# Patient Record
Sex: MALE | Race: OTHER | NOT HISPANIC OR LATINO
[De-identification: names, ages, dates, MRNs, and addresses within clinical notes are randomized per-mention and may not be internally consistent; named-entity substitution may affect disease eponyms.]

---

## 2021-01-04 ENCOUNTER — TRANSCRIPTION ENCOUNTER (OUTPATIENT)
Age: 56
End: 2021-01-04

## 2021-05-19 ENCOUNTER — INPATIENT (INPATIENT)
Facility: HOSPITAL | Age: 56
LOS: 1 days | Discharge: HOME | End: 2021-05-21
Attending: INTERNAL MEDICINE | Admitting: INTERNAL MEDICINE
Payer: MEDICAID

## 2021-05-19 VITALS
DIASTOLIC BLOOD PRESSURE: 63 MMHG | HEART RATE: 102 BPM | SYSTOLIC BLOOD PRESSURE: 104 MMHG | OXYGEN SATURATION: 100 % | RESPIRATION RATE: 18 BRPM | TEMPERATURE: 96 F

## 2021-05-19 LAB
ALBUMIN SERPL ELPH-MCNC: 4.4 G/DL — SIGNIFICANT CHANGE UP (ref 3.5–5.2)
ALP SERPL-CCNC: 84 U/L — SIGNIFICANT CHANGE UP (ref 30–115)
ALT FLD-CCNC: 16 U/L — SIGNIFICANT CHANGE UP (ref 0–41)
ANION GAP SERPL CALC-SCNC: 15 MMOL/L — HIGH (ref 7–14)
APTT BLD: 30.4 SEC — SIGNIFICANT CHANGE UP (ref 27–39.2)
AST SERPL-CCNC: 18 U/L — SIGNIFICANT CHANGE UP (ref 0–41)
BASOPHILS # BLD AUTO: 0.08 K/UL — SIGNIFICANT CHANGE UP (ref 0–0.2)
BASOPHILS NFR BLD AUTO: 0.5 % — SIGNIFICANT CHANGE UP (ref 0–1)
BILIRUB SERPL-MCNC: 0.2 MG/DL — SIGNIFICANT CHANGE UP (ref 0.2–1.2)
BLD GP AB SCN SERPL QL: SIGNIFICANT CHANGE UP
BUN SERPL-MCNC: 14 MG/DL — SIGNIFICANT CHANGE UP (ref 10–20)
CALCIUM SERPL-MCNC: 9.2 MG/DL — SIGNIFICANT CHANGE UP (ref 8.5–10.1)
CHLORIDE SERPL-SCNC: 104 MMOL/L — SIGNIFICANT CHANGE UP (ref 98–110)
CO2 SERPL-SCNC: 16 MMOL/L — LOW (ref 17–32)
CREAT SERPL-MCNC: 1.5 MG/DL — SIGNIFICANT CHANGE UP (ref 0.7–1.5)
EOSINOPHIL # BLD AUTO: 0.25 K/UL — SIGNIFICANT CHANGE UP (ref 0–0.7)
EOSINOPHIL NFR BLD AUTO: 1.7 % — SIGNIFICANT CHANGE UP (ref 0–8)
GLUCOSE BLDC GLUCOMTR-MCNC: 128 MG/DL — HIGH (ref 70–99)
GLUCOSE SERPL-MCNC: 118 MG/DL — HIGH (ref 70–99)
HCT VFR BLD CALC: 45.5 % — SIGNIFICANT CHANGE UP (ref 42–52)
HGB BLD-MCNC: 15.6 G/DL — SIGNIFICANT CHANGE UP (ref 14–18)
IMM GRANULOCYTES NFR BLD AUTO: 0.5 % — HIGH (ref 0.1–0.3)
INR BLD: 0.94 RATIO — SIGNIFICANT CHANGE UP (ref 0.65–1.3)
LYMPHOCYTES # BLD AUTO: 36.4 % — SIGNIFICANT CHANGE UP (ref 20.5–51.1)
LYMPHOCYTES # BLD AUTO: 5.38 K/UL — HIGH (ref 1.2–3.4)
MCHC RBC-ENTMCNC: 30.5 PG — SIGNIFICANT CHANGE UP (ref 27–31)
MCHC RBC-ENTMCNC: 34.3 G/DL — SIGNIFICANT CHANGE UP (ref 32–37)
MCV RBC AUTO: 89 FL — SIGNIFICANT CHANGE UP (ref 80–94)
MONOCYTES # BLD AUTO: 1.05 K/UL — HIGH (ref 0.1–0.6)
MONOCYTES NFR BLD AUTO: 7.1 % — SIGNIFICANT CHANGE UP (ref 1.7–9.3)
NEUTROPHILS # BLD AUTO: 7.93 K/UL — HIGH (ref 1.4–6.5)
NEUTROPHILS NFR BLD AUTO: 53.8 % — SIGNIFICANT CHANGE UP (ref 42.2–75.2)
NRBC # BLD: 0 /100 WBCS — SIGNIFICANT CHANGE UP (ref 0–0)
PLATELET # BLD AUTO: 289 K/UL — SIGNIFICANT CHANGE UP (ref 130–400)
POTASSIUM SERPL-MCNC: 4.4 MMOL/L — SIGNIFICANT CHANGE UP (ref 3.5–5)
POTASSIUM SERPL-SCNC: 4.4 MMOL/L — SIGNIFICANT CHANGE UP (ref 3.5–5)
PROT SERPL-MCNC: 7.6 G/DL — SIGNIFICANT CHANGE UP (ref 6–8)
PROTHROM AB SERPL-ACNC: 10.8 SEC — SIGNIFICANT CHANGE UP (ref 9.95–12.87)
RAPID RVP RESULT: SIGNIFICANT CHANGE UP
RBC # BLD: 5.11 M/UL — SIGNIFICANT CHANGE UP (ref 4.7–6.1)
RBC # FLD: 12.8 % — SIGNIFICANT CHANGE UP (ref 11.5–14.5)
SARS-COV-2 RNA SPEC QL NAA+PROBE: SIGNIFICANT CHANGE UP
SODIUM SERPL-SCNC: 135 MMOL/L — SIGNIFICANT CHANGE UP (ref 135–146)
TROPONIN T SERPL-MCNC: 0.03 NG/ML — CRITICAL HIGH
WBC # BLD: 14.77 K/UL — HIGH (ref 4.8–10.8)
WBC # FLD AUTO: 14.77 K/UL — HIGH (ref 4.8–10.8)

## 2021-05-19 PROCEDURE — 93010 ELECTROCARDIOGRAM REPORT: CPT

## 2021-05-19 PROCEDURE — 71045 X-RAY EXAM CHEST 1 VIEW: CPT | Mod: 26

## 2021-05-19 PROCEDURE — 99222 1ST HOSP IP/OBS MODERATE 55: CPT

## 2021-05-19 PROCEDURE — 99285 EMERGENCY DEPT VISIT HI MDM: CPT

## 2021-05-19 RX ORDER — NICOTINE POLACRILEX 2 MG
1 GUM BUCCAL DAILY
Refills: 0 | Status: DISCONTINUED | OUTPATIENT
Start: 2021-05-19 | End: 2021-05-21

## 2021-05-19 RX ORDER — ATORVASTATIN CALCIUM 80 MG/1
80 TABLET, FILM COATED ORAL AT BEDTIME
Refills: 0 | Status: DISCONTINUED | OUTPATIENT
Start: 2021-05-19 | End: 2021-05-21

## 2021-05-19 RX ORDER — METOPROLOL TARTRATE 50 MG
12.5 TABLET ORAL
Refills: 0 | Status: DISCONTINUED | OUTPATIENT
Start: 2021-05-19 | End: 2021-05-20

## 2021-05-19 RX ORDER — TICAGRELOR 90 MG/1
90 TABLET ORAL EVERY 12 HOURS
Refills: 0 | Status: DISCONTINUED | OUTPATIENT
Start: 2021-05-20 | End: 2021-05-21

## 2021-05-19 RX ORDER — ASPIRIN/CALCIUM CARB/MAGNESIUM 324 MG
81 TABLET ORAL DAILY
Refills: 0 | Status: DISCONTINUED | OUTPATIENT
Start: 2021-05-19 | End: 2021-05-21

## 2021-05-19 RX ORDER — SODIUM CHLORIDE 9 MG/ML
1000 INJECTION INTRAMUSCULAR; INTRAVENOUS; SUBCUTANEOUS
Refills: 0 | Status: DISCONTINUED | OUTPATIENT
Start: 2021-05-19 | End: 2021-05-20

## 2021-05-19 RX ORDER — ENOXAPARIN SODIUM 100 MG/ML
40 INJECTION SUBCUTANEOUS AT BEDTIME
Refills: 0 | Status: DISCONTINUED | OUTPATIENT
Start: 2021-05-19 | End: 2021-05-21

## 2021-05-19 RX ADMIN — ATORVASTATIN CALCIUM 80 MILLIGRAM(S): 80 TABLET, FILM COATED ORAL at 21:18

## 2021-05-19 RX ADMIN — Medication 1 PATCH: at 22:10

## 2021-05-19 RX ADMIN — ENOXAPARIN SODIUM 40 MILLIGRAM(S): 100 INJECTION SUBCUTANEOUS at 22:11

## 2021-05-19 RX ADMIN — Medication 12.5 MILLIGRAM(S): at 22:11

## 2021-05-19 NOTE — CHART NOTE - NSCHARTNOTEFT_GEN_A_CORE
PRE-OP DIAGNOSIS: STEMI    PROCEDURE:[x  ] LHC                         [ x ] Coronary angiography                         [  ] [facility]  Physician: Dr Bush  Assistant: Dr Andres Forrest    ANESTHESIA TYPE:  [  ]General Anesthesia  [ x ] Sedation  [  ] Local/Regional    ESTIMATED BLOOD LOSS:    10   mL    CONDITION  [  ] Critical  [  ] Serious  [  ]Fair  [ x ]Good    IV CONTRAST:  150  mL    FINDINGS  Left Heart Catheterization:  LVEF%:  LVEDP:  [ ] Normal Coronary Arteries  [ ] Luminal Irregularities  [ ] Non-obstructive CAD    ACCESS:    [x ] right radial artery  [ ] right femoral artery    HEMOSTASIS:    [ x] D-Stat  [ ] Perclose  [ ] Manual compression    LEFT HEART CATHETERIZATION                                    Left main: no disease  LAD:  100% acute occlusion in mid segment  Left Circumflex: patent  Right Coronary Artery: 100% ostial chronic total occlusion , filled by collateral distally    SYNTAX I/II SCORE:    INTERVENTION  IMPLANTS: 2 ROMI    APPROPRIATE USE CRITERIA (AUC): 9    SPECIMEN REMOVED: N/A      POST-OP DIAGNOSIS  STEMI , culprit lesion is mid LAD s/p IVUS guided PCI with 2 ROMI        PLAN OF CARE  admit to CCU  [ x]  Continue DAPT, B-blocker & Statin therapy  IV fluids  check 2d echo

## 2021-05-19 NOTE — ED ADULT NURSE NOTE - NSIMPLEMENTINTERV_GEN_ALL_ED
Implemented All Fall with Harm Risk Interventions:  [place] to call system. Call bell, personal items and telephone within reach. Instruct patient to call for assistance. Room bathroom lighting operational. Non-slip footwear when patient is off stretcher. Physically safe environment: no spills, clutter or unnecessary equipment. Stretcher in lowest position, wheels locked, appropriate side rails in place. Provide visual cue, wrist band, yellow gown, etc. Monitor gait and stability. Monitor for mental status changes and reorient to person, place, and time. Review medications for side effects contributing to fall risk. Reinforce activity limits and safety measures with patient and family. Provide visual clues: red socks.

## 2021-05-19 NOTE — ED PROVIDER NOTE - OBJECTIVE STATEMENT
56 y/o male with hx of CAD (no stents), presents for chest pain that started 40 minutes PTA, moderate, constant, no change since onset. No n/v, diaphoresis. Pt took ASA 81x4 PTA. EMS with STEMI on EKG, nitro x2 and morphine given. STEMI code called as a pre-note. Upon arrival to ED pt looks comfortable.  No back pain, edema of lower extremities, calf pain.

## 2021-05-19 NOTE — ED PROVIDER NOTE - NS ED ROS FT
Constitutional: See HPI.  Eyes: No visual changes, eye pain or discharge.  ENMT: No hearing changes, pain, discharge or infections.   Cardiac: + chest pain. No SOB or edema. No chest pain with exertion.  Respiratory: No cough or respiratory distress.   GI: No nausea, vomiting, diarrhea or abdominal pain.  : No dysuria, frequency or burning. No Discharge  MS: No myalgia, muscle weakness, joint pain or back pain.  Neuro: No headache or weakness.   Skin: No skin rash.  Except as documented in the HPI, all other systems are negative.

## 2021-05-19 NOTE — H&P ADULT - NSICDXFAMILYHX_GEN_ALL_CORE_FT
FAMILY HISTORY:  Mother  Still living? Unknown  FH: coronary artery disease, Age at diagnosis: Age Unknown    Sibling  Still living? Unknown  FH: coronary artery disease, Age at diagnosis: Age Unknown

## 2021-05-19 NOTE — H&P ADULT - NSHPLABSRESULTS_GEN_ALL_CORE
15.6   14.77 )-----------( 289      ( 19 May 2021 16:35 )             45.5             05-19    135  |  104  |  14  ----------------------------<  118<H>  4.4   |  16<L>  |  1.5    Ca    9.2      19 May 2021 16:35    TPro  7.6  /  Alb  4.4  /  TBili  0.2  /  DBili  x   /  AST  18  /  ALT  16  /  AlkPhos  84  05-19    LIVER FUNCTIONS - ( 19 May 2021 16:35 )  Alb: 4.4 g/dL / Pro: 7.6 g/dL / ALK PHOS: 84 U/L / ALT: 16 U/L / AST: 18 U/L / GGT: x         PT/INR - ( 19 May 2021 16:35 )   PT: 10.80 sec;   INR: 0.94 ratio         PTT - ( 19 May 2021 16:35 )  PTT:30.4 sec  CARDIAC MARKERS ( 19 May 2021 16:35 )  x     / 0.03 ng/mL / x     / x     / x

## 2021-05-19 NOTE — ED PROVIDER NOTE - ATTENDING CONTRIBUTION TO CARE
I personally evaluated the patient. I reviewed the Resident’s or Physician Assistant’s note (as assigned above), and agree with the findings and plan except as documented in my note.    Pt is a 56 y/o male with hx of CAD (no stents), presents for chest pain that started 40 minutes PTA, moderate, constant, no change since onset. No n/v, diaphoresis. Pt took ASA 81x4 PTA. EMS with sTEMI on EKG, nitro x2 and morphine givne. STEMI code called with milady.    Constitutional: Well developed, well nourished. In moderate distress.  Head: Normocephalic, atraumatic.  Eyes: PERRL. EOMI.  ENT: No nasal discharge. Mucous membranes moist.  Neck: Supple. Painless ROM.  Cardiovascular: Normal S1, S2. Regular rate and rhythm. No murmurs, rubs, or gallops.  Pulmonary: Normal respiratory rate and effort. Lungs clear to auscultation bilaterally. No wheezing, rales, or rhonchi.  Abdominal: Soft. Nondistended. Nontender. No rebound, guarding, rigidity.  Extremities. Pelvis stable. No lower extremity edema, symmetric calves.  Skin: No rashes, cyanosis.  Neuro: AAOx3. No focal neurological deficits.  Psych: Normal mood. Normal affect.

## 2021-05-19 NOTE — ED PROVIDER NOTE - CLINICAL SUMMARY MEDICAL DECISION MAKING FREE TEXT BOX
Pt presented to ED for chest pain, STEMI code called prearrival. ED EKG with STEMI, cardiology to take to cath lab. No brilinta/plavix per cards as pt may need CABG. Labs sent. Pt admitted to CCU.

## 2021-05-19 NOTE — H&P ADULT - HISTORY OF PRESENT ILLNESS
Chest pain 1 hr duration    56 yo male with no significant pmh but significant family history of CAD in mother and brother.   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 He called EMS, was done EKG found to have stemi and brought to the ED.  Aspirin was given on route.  patient also reports that he had brief episode of chest discomfort yesterday morning but got relieved on its own.  no Headache, focal deficit, SOB, cough, fever, abdominal pain or swelling, frequency or urgency. does not take any medication at home.    in the ED, EKG showed STEMI anterior, taken to cath lab s/p PCI, ROMI to mLAD.

## 2021-05-19 NOTE — H&P ADULT - ASSESSMENT
Impression:  STEMI: anterolateral  s/p PCI on mid LAD with 2 ROMI      Plan:  DAPT with aspirin and plavix  statin  ECHO  lifestyle modification  beta blocker  follow up with cardiology   Impression:  STEMI: anterior  s/p PCI on mid LAD with 2 ROMI      Plan:  DAPT with aspirin and brillinta  lipitor 80 daily  ECHO  lifestyle modification  smoking cessation  nicotine patch  beta blocker metoprolol 12.5 BID  follow up with cardiology  IV fluid NS at 100 cc/hr for 6 hr  remove D-stat at 10:30 pm  dc home likely in am  repeat labs in am  ccu monitoring overnight

## 2021-05-19 NOTE — ED ADULT TRIAGE NOTE - CHIEF COMPLAINT QUOTE
Patient was prenotification STEMI alert c/o 25 min of severe CP. 4 asa, 5 mg Morphine IVP, and 2 sublingual nitro given prior to arrival. Patient presents with 10/10 pain and diaphoretic

## 2021-05-20 LAB
A1C WITH ESTIMATED AVERAGE GLUCOSE RESULT: 5.3 % — SIGNIFICANT CHANGE UP (ref 4–5.6)
ALBUMIN SERPL ELPH-MCNC: 3.8 G/DL — SIGNIFICANT CHANGE UP (ref 3.5–5.2)
ALBUMIN SERPL ELPH-MCNC: 3.9 G/DL — SIGNIFICANT CHANGE UP (ref 3.5–5.2)
ALP SERPL-CCNC: 74 U/L — SIGNIFICANT CHANGE UP (ref 30–115)
ALP SERPL-CCNC: 78 U/L — SIGNIFICANT CHANGE UP (ref 30–115)
ALT FLD-CCNC: 14 U/L — SIGNIFICANT CHANGE UP (ref 0–41)
ALT FLD-CCNC: 15 U/L — SIGNIFICANT CHANGE UP (ref 0–41)
ANION GAP SERPL CALC-SCNC: 11 MMOL/L — SIGNIFICANT CHANGE UP (ref 7–14)
ANION GAP SERPL CALC-SCNC: 12 MMOL/L — SIGNIFICANT CHANGE UP (ref 7–14)
AST SERPL-CCNC: 25 U/L — SIGNIFICANT CHANGE UP (ref 0–41)
AST SERPL-CCNC: 30 U/L — SIGNIFICANT CHANGE UP (ref 0–41)
BASOPHILS # BLD AUTO: 0.08 K/UL — SIGNIFICANT CHANGE UP (ref 0–0.2)
BASOPHILS NFR BLD AUTO: 0.5 % — SIGNIFICANT CHANGE UP (ref 0–1)
BILIRUB SERPL-MCNC: 0.4 MG/DL — SIGNIFICANT CHANGE UP (ref 0.2–1.2)
BILIRUB SERPL-MCNC: 0.7 MG/DL — SIGNIFICANT CHANGE UP (ref 0.2–1.2)
BUN SERPL-MCNC: 14 MG/DL — SIGNIFICANT CHANGE UP (ref 10–20)
BUN SERPL-MCNC: 14 MG/DL — SIGNIFICANT CHANGE UP (ref 10–20)
CALCIUM SERPL-MCNC: 8.9 MG/DL — SIGNIFICANT CHANGE UP (ref 8.5–10.1)
CALCIUM SERPL-MCNC: 9.2 MG/DL — SIGNIFICANT CHANGE UP (ref 8.5–10.1)
CHLORIDE SERPL-SCNC: 102 MMOL/L — SIGNIFICANT CHANGE UP (ref 98–110)
CHLORIDE SERPL-SCNC: 104 MMOL/L — SIGNIFICANT CHANGE UP (ref 98–110)
CHOLEST SERPL-MCNC: 196 MG/DL — SIGNIFICANT CHANGE UP
CO2 SERPL-SCNC: 18 MMOL/L — SIGNIFICANT CHANGE UP (ref 17–32)
CO2 SERPL-SCNC: 21 MMOL/L — SIGNIFICANT CHANGE UP (ref 17–32)
COVID-19 SPIKE DOMAIN AB INTERP: POSITIVE
COVID-19 SPIKE DOMAIN ANTIBODY RESULT: 184 U/ML — HIGH
CREAT SERPL-MCNC: 1.3 MG/DL — SIGNIFICANT CHANGE UP (ref 0.7–1.5)
CREAT SERPL-MCNC: 1.4 MG/DL — SIGNIFICANT CHANGE UP (ref 0.7–1.5)
EOSINOPHIL # BLD AUTO: 0.15 K/UL — SIGNIFICANT CHANGE UP (ref 0–0.7)
EOSINOPHIL NFR BLD AUTO: 0.9 % — SIGNIFICANT CHANGE UP (ref 0–8)
ESTIMATED AVERAGE GLUCOSE: 105 MG/DL — SIGNIFICANT CHANGE UP (ref 68–114)
GLUCOSE SERPL-MCNC: 90 MG/DL — SIGNIFICANT CHANGE UP (ref 70–99)
GLUCOSE SERPL-MCNC: 93 MG/DL — SIGNIFICANT CHANGE UP (ref 70–99)
HCT VFR BLD CALC: 45.3 % — SIGNIFICANT CHANGE UP (ref 42–52)
HCT VFR BLD CALC: 45.3 % — SIGNIFICANT CHANGE UP (ref 42–52)
HDLC SERPL-MCNC: 31 MG/DL — LOW
HGB BLD-MCNC: 15.3 G/DL — SIGNIFICANT CHANGE UP (ref 14–18)
HGB BLD-MCNC: 15.4 G/DL — SIGNIFICANT CHANGE UP (ref 14–18)
IMM GRANULOCYTES NFR BLD AUTO: 0.5 % — HIGH (ref 0.1–0.3)
LIPID PNL WITH DIRECT LDL SERPL: 147 MG/DL — HIGH
LYMPHOCYTES # BLD AUTO: 13.1 % — LOW (ref 20.5–51.1)
LYMPHOCYTES # BLD AUTO: 2.18 K/UL — SIGNIFICANT CHANGE UP (ref 1.2–3.4)
MAGNESIUM SERPL-MCNC: 1.9 MG/DL — SIGNIFICANT CHANGE UP (ref 1.8–2.4)
MCHC RBC-ENTMCNC: 30.5 PG — SIGNIFICANT CHANGE UP (ref 27–31)
MCHC RBC-ENTMCNC: 30.6 PG — SIGNIFICANT CHANGE UP (ref 27–31)
MCHC RBC-ENTMCNC: 33.8 G/DL — SIGNIFICANT CHANGE UP (ref 32–37)
MCHC RBC-ENTMCNC: 34 G/DL — SIGNIFICANT CHANGE UP (ref 32–37)
MCV RBC AUTO: 90.1 FL — SIGNIFICANT CHANGE UP (ref 80–94)
MCV RBC AUTO: 90.4 FL — SIGNIFICANT CHANGE UP (ref 80–94)
MONOCYTES # BLD AUTO: 0.85 K/UL — HIGH (ref 0.1–0.6)
MONOCYTES NFR BLD AUTO: 5.1 % — SIGNIFICANT CHANGE UP (ref 1.7–9.3)
NEUTROPHILS # BLD AUTO: 13.3 K/UL — HIGH (ref 1.4–6.5)
NEUTROPHILS NFR BLD AUTO: 79.9 % — HIGH (ref 42.2–75.2)
NON HDL CHOLESTEROL: 165 MG/DL — HIGH
NRBC # BLD: 0 /100 WBCS — SIGNIFICANT CHANGE UP (ref 0–0)
NRBC # BLD: 0 /100 WBCS — SIGNIFICANT CHANGE UP (ref 0–0)
PHOSPHATE SERPL-MCNC: 3.4 MG/DL — SIGNIFICANT CHANGE UP (ref 2.1–4.9)
PLATELET # BLD AUTO: 250 K/UL — SIGNIFICANT CHANGE UP (ref 130–400)
PLATELET # BLD AUTO: 255 K/UL — SIGNIFICANT CHANGE UP (ref 130–400)
POTASSIUM SERPL-MCNC: 4.2 MMOL/L — SIGNIFICANT CHANGE UP (ref 3.5–5)
POTASSIUM SERPL-MCNC: 4.3 MMOL/L — SIGNIFICANT CHANGE UP (ref 3.5–5)
POTASSIUM SERPL-SCNC: 4.2 MMOL/L — SIGNIFICANT CHANGE UP (ref 3.5–5)
POTASSIUM SERPL-SCNC: 4.3 MMOL/L — SIGNIFICANT CHANGE UP (ref 3.5–5)
PROT SERPL-MCNC: 6.6 G/DL — SIGNIFICANT CHANGE UP (ref 6–8)
PROT SERPL-MCNC: 7 G/DL — SIGNIFICANT CHANGE UP (ref 6–8)
RBC # BLD: 5.01 M/UL — SIGNIFICANT CHANGE UP (ref 4.7–6.1)
RBC # BLD: 5.03 M/UL — SIGNIFICANT CHANGE UP (ref 4.7–6.1)
RBC # FLD: 13.2 % — SIGNIFICANT CHANGE UP (ref 11.5–14.5)
RBC # FLD: 13.2 % — SIGNIFICANT CHANGE UP (ref 11.5–14.5)
SARS-COV-2 IGG+IGM SERPL QL IA: 184 U/ML — HIGH
SARS-COV-2 IGG+IGM SERPL QL IA: POSITIVE
SODIUM SERPL-SCNC: 133 MMOL/L — LOW (ref 135–146)
SODIUM SERPL-SCNC: 135 MMOL/L — SIGNIFICANT CHANGE UP (ref 135–146)
TRIGL SERPL-MCNC: 205 MG/DL — HIGH
WBC # BLD: 13.43 K/UL — HIGH (ref 4.8–10.8)
WBC # BLD: 16.65 K/UL — HIGH (ref 4.8–10.8)
WBC # FLD AUTO: 13.43 K/UL — HIGH (ref 4.8–10.8)
WBC # FLD AUTO: 16.65 K/UL — HIGH (ref 4.8–10.8)

## 2021-05-20 PROCEDURE — 93010 ELECTROCARDIOGRAM REPORT: CPT

## 2021-05-20 PROCEDURE — 93306 TTE W/DOPPLER COMPLETE: CPT | Mod: 26

## 2021-05-20 RX ORDER — METOPROLOL TARTRATE 50 MG
25 TABLET ORAL
Refills: 0 | Status: DISCONTINUED | OUTPATIENT
Start: 2021-05-20 | End: 2021-05-21

## 2021-05-20 RX ORDER — CHLORHEXIDINE GLUCONATE 213 G/1000ML
1 SOLUTION TOPICAL
Refills: 0 | Status: DISCONTINUED | OUTPATIENT
Start: 2021-05-20 | End: 2021-05-21

## 2021-05-20 RX ORDER — METOPROLOL TARTRATE 50 MG
12.5 TABLET ORAL ONCE
Refills: 0 | Status: COMPLETED | OUTPATIENT
Start: 2021-05-20 | End: 2021-05-20

## 2021-05-20 RX ORDER — TICAGRELOR 90 MG/1
1 TABLET ORAL
Qty: 60 | Refills: 1
Start: 2021-05-20 | End: 2021-07-18

## 2021-05-20 RX ADMIN — TICAGRELOR 90 MILLIGRAM(S): 90 TABLET ORAL at 06:57

## 2021-05-20 RX ADMIN — TICAGRELOR 90 MILLIGRAM(S): 90 TABLET ORAL at 17:25

## 2021-05-20 RX ADMIN — Medication 81 MILLIGRAM(S): at 11:48

## 2021-05-20 RX ADMIN — Medication 25 MILLIGRAM(S): at 17:25

## 2021-05-20 RX ADMIN — ATORVASTATIN CALCIUM 80 MILLIGRAM(S): 80 TABLET, FILM COATED ORAL at 21:03

## 2021-05-20 RX ADMIN — Medication 12.5 MILLIGRAM(S): at 05:57

## 2021-05-20 RX ADMIN — Medication 12.5 MILLIGRAM(S): at 11:35

## 2021-05-20 RX ADMIN — ENOXAPARIN SODIUM 40 MILLIGRAM(S): 100 INJECTION SUBCUTANEOUS at 21:03

## 2021-05-20 RX ADMIN — Medication 1 PATCH: at 11:48

## 2021-05-20 RX ADMIN — Medication 1 PATCH: at 11:47

## 2021-05-20 RX ADMIN — Medication 1 PATCH: at 18:56

## 2021-05-20 RX ADMIN — Medication 1 PATCH: at 05:54

## 2021-05-20 NOTE — PROGRESS NOTE ADULT - SUBJECTIVE AND OBJECTIVE BOX
Cardiology Follow up    DRISS MOORE   55y Male  PAST MEDICAL & SURGICAL HISTORY:  No pertinent past medical history    No significant past surgical history         HPI:  Chest pain 1 hr duration    56 yo male with no significant pmh but significant family history of CAD in mother and brother.   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 He called EMS, was done EKG found to have stemi and brought to the ED.  Aspirin was given on route.  patient also reports that he had brief episode of chest discomfort yesterday morning but got relieved on its own.  no Headache, focal deficit, SOB, cough, fever, abdominal pain or swelling, frequency or urgency. does not take any medication at home.    in the ED, EKG showed STEMI anterior, taken to cath lab s/p PCI, ROMI to mLAD. (19 May 2021 20:20)    Allergies    No Known Allergies    Intolerances    Patient seen and examined at bedside. No acute events overnight.  Patient reported short spells of dyspnea overnight lasting couple of second each, feeling better in AM - from taking Brilinta.   Denies CP, SOB, palpitations, or dizziness  NSVT 11 beats event noted on telemetry overnight    Vital Signs Last 24 Hrs  T(C): 36.2 (20 May 2021 12:00), Max: 37.1 (19 May 2021 20:12)  T(F): 97.2 (20 May 2021 12:00), Max: 98.7 (19 May 2021 20:12)  HR: 84 (20 May 2021 12:00) (78 - 102)  BP: 129/86 (20 May 2021 12:00) (104/63 - 129/86)  BP(mean): 106 (20 May 2021 12:00) (83 - 106)  RR: 18 (20 May 2021 12:00) (12 - 21)  SpO2: 97% (20 May 2021 12:00) (93% - 100%)    MEDICATIONS  (STANDING):  aspirin  chewable 81 milliGRAM(s) Oral daily  atorvastatin 80 milliGRAM(s) Oral at bedtime  chlorhexidine 4% Liquid 1 Application(s) Topical <User Schedule>  enoxaparin Injectable 40 milliGRAM(s) SubCutaneous at bedtime  metoprolol tartrate 25 milliGRAM(s) Oral two times a day  nicotine -   7 mG/24Hr(s) Patch 1 patch Transdermal daily  ticagrelor 90 milliGRAM(s) Oral every 12 hours    MEDICATIONS  (PRN):    REVIEW OF SYSTEMS:          All negative except as mentioned in HPI    PHYSICAL EXAM:           CONSTITUTIONAL: Well-developed; well-nourished; in no acute distress  	SKIN: warm, dry  	HEAD: Normocephalic; atraumatic  	EYES: PERRL.  	ENT: No nasal discharge, airway clear, mucous membranes moist  	NECK: Supple; non tender.  	CARD: +S1, +S2, no murmurs, gallops, or rubs. Regular rate and rhythm    	RESP: No wheezes, rales or rhonchi. CTA B/L  	ABD: soft ntnd, + BS x 4 quadrants  	EXT: moves all extremities,  no clubbing, cyanosis or edema  	NEURO: Alert and oriented x3, no focal deficits          PSYCH: Cooperative, appropriate          VASCULAR:  + 2 Rad / + 2 PTs / + 2 DPs          EXTREMITY:              Right Radial: pressure dressing removed, access site soft, no hematoma, no pain, + pulses, no sign of infection, no numbness            ECG:   < from: 12 Lead ECG (05.20.21 @ 05:31) >  Ventricular Rate 79 BPM    Atrial Rate 79 BPM    P-R Interval 158 ms    QRS Duration 70 ms    Q-T Interval 346 ms    QTC Calculation(Bazett) 396 ms    P Axis -7 degrees    R Axis 57 degrees    T Axis 65 degrees    Diagnosis Line Normal sinus rhythm  Normal ECG    Confirmed by Sebastian Moreira ([id number]) on 5/20/2021 8:06:47 AM                                                                                          2D ECHO:  P    LABS:                        15.4   16.65 )-----------( 250      ( 20 May 2021 04:17 )             45.3     05-20    133<L>  |  104  |  14  ----------------------------<  93  4.3   |  18  |  1.3    Ca    8.9      20 May 2021 04:17  Phos  3.4     05-20  Mg     1.9     05-20    TPro  6.6  /  Alb  3.8  /  TBili  0.4  /  DBili  x   /  AST  30  /  ALT  15  /  AlkPhos  78  05-20    CARDIAC MARKERS ( 19 May 2021 16:35 )  x     / 0.03 ng/mL / x     / x     / x        Magnesium, Serum: 1.9 mg/dL [1.8 - 2.4] (05-20-21 @ 04:17)  LIVER FUNCTIONS - ( 20 May 2021 04:17 )  Alb: 3.8 g/dL / Pro: 6.6 g/dL / ALK PHOS: 78 U/L / ALT: 15 U/L / AST: 30 U/L / GGT: x           LDL Cholesterol Calculated: 147 mg/dL   A1C with Estimated Average Glucose Result: 5.3    A/P:  I discussed the case with Cardiologist Dr. Bush and recommend the following:    S/P PCI:   INTERVENTION  IMPLANTS: 2 ROMI    APPROPRIATE USE CRITERIA (AUC): 9    POST-OP DIAGNOSIS  STEMI , culprit lesion is mid LAD s/p IVUS guided PCI with 2 ROMI                      F/U 2D Echo result                    No ACEi/ARB due to elevated Cr, will reevaluate tomorrow                    Titrate BB to HR 60-70 bmp  	     Continue DAPT ( Aspirin 81 mg PO Daily and Brilinta 90 mg PO q12hr ), B-Blocker, Statin Therapy                   Patient pharmacy called in for Brilinta prescription and it is 30$ per month, patient is agreeing for it and medication is available for  5/21/21                    GI / DVT prophylaxis                    Keep K = 4, Mg = 2                   Ambulate as tolerated, OOB to chair                    Patient agreeing to take DAPT for at least one year or as directed by cardiologist                    Pt given instructions on importance of taking antiplatelet medication or risk acute stent thrombosis/death                   Post cath instructions, access site care and activity restrictions reviewed with patient                     Discussed with patient to return to hospital if experience chest pain, shortness breath, dizziness and site bleeding                   Aggressive risk factor modification, diet counseling, smoking cessation discussed with patient                       Cardiac rehab information provided/ referral and communication to cardiac rehab provided                   Monitor in CCU

## 2021-05-20 NOTE — PROGRESS NOTE ADULT - SUBJECTIVE AND OBJECTIVE BOX
HPI:  Chest pain 1 hr duration    56 yo male with no significant pmh but significant family history of CAD in mother and brother.   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 He called EMS, was done EKG found to have stemi and brought to the ED.  Aspirin was given on route.  patient also reports that he had brief episode of chest discomfort yesterday morning but got relieved on its own.  no Headache, focal deficit, SOB, cough, fever, abdominal pain or swelling, frequency or urgency. does not take any medication at home.    in the ED, EKG showed STEMI anterior, taken to cath lab s/p PCI, ROMI to mLAD. (19 May 2021 20:20)      INTERVAL HISTORY:  pt admitted yesterday for STEMI--s/p cath with PCI x2 ROMI to mid-LAD lesion    This morning, pt feels much improved. rates CP at /10 compared to 10/10 on presentation. denies any SOB, n/v/d/c, has not yet gotten OOB    PAST MEDICAL & SURGICAL HISTORY  No pertinent past medical history    No significant past surgical history        ALLERGIES:  No Known Allergies      MEDICATIONS:  MEDICATIONS  (STANDING):  aspirin  chewable 81 milliGRAM(s) Oral daily  atorvastatin 80 milliGRAM(s) Oral at bedtime  chlorhexidine 4% Liquid 1 Application(s) Topical <User Schedule>  enoxaparin Injectable 40 milliGRAM(s) SubCutaneous at bedtime  metoprolol tartrate 12.5 milliGRAM(s) Oral two times a day  nicotine -   7 mG/24Hr(s) Patch 1 patch Transdermal daily  sodium chloride 0.9%. 1000 milliLiter(s) (100 mL/Hr) IV Continuous <Continuous>  ticagrelor 90 milliGRAM(s) Oral every 12 hours    MEDICATIONS  (PRN):      HOME MEDICATIONS:  Home Medications:        OBJECTIVE:  ICU Vital Signs Last 24 Hrs  T(C): 37.1 (19 May 2021 20:12), Max: 37.1 (19 May 2021 20:12)  T(F): 98.7 (19 May 2021 20:12), Max: 98.7 (19 May 2021 20:12)  HR: 78 (20 May 2021 06:05) (78 - 102)  BP: 111/70 (20 May 2021 06:05) (104/63 - 127/87)  BP(mean): 83 (20 May 2021 06:05) (83 - 101)  ABP: --  ABP(mean): --  RR: 16 (20 May 2021 06:05) (12 - 21)  SpO2: 95% (20 May 2021 04:05) (93% - 100%)      Adult Advanced Hemodynamics Last 24 Hrs  CVP(mm Hg): --  CVP(cm H2O): --  CO: --  CI: --  PA: --  PA(mean): --  PCWP: --  SVR: --  SVRI: --  PVR: --  PVRI: --  I&O's Summary    19 May 2021 07:01  -  20 May 2021 06:53  --------------------------------------------------------  IN: 1000 mL / OUT: 900 mL / NET: 100 mL      Daily Height in cm: 177 (19 May 2021 20:12)    Daily Weight in k.1 (20 May 2021 06:05)    PHYSICAL EXAM:  NEURO: patient is awake , alert and oriented  GEN: Not in acute distress  NECK: no JVD  LUNGS: Clear to auscultation bilaterally; non labored  CARDIOVASCULAR: Regular rate and rhythm, no murmurs rubs  ABD: Soft, non-tender, non-distended,  EXT: No NICOLE  SKIN: Intact, rt wrist bandaged  ACCESS Site:    LABS:                        15.4   16.65 )-----------( 250      ( 20 May 2021 04:17 )             45.3     05-20    133<L>  |  104  |  14  ----------------------------<  93  4.3   |  18  |  1.3    Ca    8.9      20 May 2021 04:17  Phos  3.4     05-20  Mg     1.9     05-20    TPro  6.6  /  Alb  3.8  /  TBili  0.4  /  DBili  x   /  AST  30  /  ALT  15  /  AlkPhos  78  05-20    PT/INR - ( 19 May 2021 16:35 )   PT: 10.80 sec;   INR: 0.94 ratio         PTT - ( 19 May 2021 16:35 )  PTT:30.4 sec  Troponin T, Serum: 0.03 ng/mL *HH* (21 @ 16:35)    CARDIAC MARKERS ( 19 May 2021 16:35 )  x     / 0.03 ng/mL / x     / x     / x            Troponin trend:       Chol 196 LDL -- HDL 31<L> Trig 205<H>      RADIOLOGY:  -CXR:  -TTE:  -CATHETERIZATION :  LEFT HEART CATHETERIZATION                                    Left main: no disease  LAD:  100% acute occlusion in mid segment  Left Circumflex: patent  Right Coronary Artery: 100% ostial chronic total occlusion , filled by collateral distally      POST-OP DIAGNOSIS  STEMI , culprit lesion is mid LAD s/p IVUS guided PCI with 2 ROMI      ECG:    TELEMETRY EVENTS:  Run of non sustained VT x10 beats at 01:39       HPI:  Chest pain 1 hr duration    56 yo male with no significant pmh but significant family history of CAD in mother and brother.   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 He called EMS, was done EKG found to have stemi and brought to the ED.  Aspirin was given on route.  patient also reports that he had brief episode of chest discomfort yesterday morning but got relieved on its own.  no Headache, focal deficit, SOB, cough, fever, abdominal pain or swelling, frequency or urgency. does not take any medication at home.    in the ED, EKG showed STEMI anterior, taken to cath lab s/p PCI, ROMI to mLAD. (19 May 2021 20:20)      INTERVAL HISTORY:  pt admitted yesterday for STEMI--s/p cath with PCI x2 ROMI to mid-LAD lesion    This morning, pt feels much improved. rates CP at /10 compared to 10/10 on presentation. denies any SOB, n/v/d/c, has not yet gotten OOB    PAST MEDICAL & SURGICAL HISTORY  No pertinent past medical history    No significant past surgical history        ALLERGIES:  No Known Allergies      MEDICATIONS:  MEDICATIONS  (STANDING):  aspirin  chewable 81 milliGRAM(s) Oral daily  atorvastatin 80 milliGRAM(s) Oral at bedtime  chlorhexidine 4% Liquid 1 Application(s) Topical <User Schedule>  enoxaparin Injectable 40 milliGRAM(s) SubCutaneous at bedtime  metoprolol tartrate 12.5 milliGRAM(s) Oral two times a day  nicotine -   7 mG/24Hr(s) Patch 1 patch Transdermal daily  sodium chloride 0.9%. 1000 milliLiter(s) (100 mL/Hr) IV Continuous <Continuous>  ticagrelor 90 milliGRAM(s) Oral every 12 hours    MEDICATIONS  (PRN):      HOME MEDICATIONS:  Home Medications:        OBJECTIVE:  ICU Vital Signs Last 24 Hrs  T(C): 37.1 (19 May 2021 20:12), Max: 37.1 (19 May 2021 20:12)  T(F): 98.7 (19 May 2021 20:12), Max: 98.7 (19 May 2021 20:12)  HR: 78 (20 May 2021 06:05) (78 - 102)  BP: 111/70 (20 May 2021 06:05) (104/63 - 127/87)  BP(mean): 83 (20 May 2021 06:05) (83 - 101)  ABP: --  ABP(mean): --  RR: 16 (20 May 2021 06:05) (12 - 21)  SpO2: 95% (20 May 2021 04:05) (93% - 100%)      Adult Advanced Hemodynamics Last 24 Hrs  CVP(mm Hg): --  CVP(cm H2O): --  CO: --  CI: --  PA: --  PA(mean): --  PCWP: --  SVR: --  SVRI: --  PVR: --  PVRI: --  I&O's Summary    19 May 2021 07:01  -  20 May 2021 06:53  --------------------------------------------------------  IN: 1000 mL / OUT: 900 mL / NET: 100 mL      Daily Height in cm: 177 (19 May 2021 20:12)    Daily Weight in k.1 (20 May 2021 06:05)    PHYSICAL EXAM:  NEURO: patient is awake , alert and oriented  GEN: Not in acute distress  NECK: no JVD  LUNGS: Clear to auscultation bilaterally; non labored  CARDIOVASCULAR: Regular rate and rhythm, no murmurs rubs  ABD: Soft, non-tender, non-distended,  EXT: No NICOLE  SKIN: Intact, rt wrist bandaged  ACCESS Site:    LABS:                        15.4   16.65 )-----------( 250      ( 20 May 2021 04:17 )             45.3     05-20    133<L>  |  104  |  14  ----------------------------<  93  4.3   |  18  |  1.3    Ca    8.9      20 May 2021 04:17  Phos  3.4     05-20  Mg     1.9     05-20    TPro  6.6  /  Alb  3.8  /  TBili  0.4  /  DBili  x   /  AST  30  /  ALT  15  /  AlkPhos  78  05-20    PT/INR - ( 19 May 2021 16:35 )   PT: 10.80 sec;   INR: 0.94 ratio         PTT - ( 19 May 2021 16:35 )  PTT:30.4 sec  Troponin T, Serum: 0.03 ng/mL *HH* (21 @ 16:35)    CARDIAC MARKERS ( 19 May 2021 16:35 )  x     / 0.03 ng/mL / x     / x     / x            Troponin trend:       Chol 196 LDL -- HDL 31<L> Trig 205<H>      RADIOLOGY:  -CXR:  -TTE:  -CATHETERIZATION :  LEFT HEART CATHETERIZATION                                    Left main: no disease  LAD:  100% acute occlusion in mid segment  Left Circumflex: patent  Right Coronary Artery: 100% ostial chronic total occlusion , filled by collateral distally      POST-OP DIAGNOSIS  STEMI , culprit lesion is mid LAD s/p IVUS guided PCI with 2 ROMI      ECG:    TELEMETRY EVENTS:  Run of non sustained VT x10 beats (8 sec) at 01:39       HPI:  Chest pain 1 hr duration    54 yo male with no significant pmh but significant family history of CAD in mother and brother.   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 He called EMS, was done EKG found to have stemi and brought to the ED.  Aspirin was given on route.  patient also reports that he had brief episode of chest discomfort yesterday morning but got relieved on its own.  no Headache, focal deficit, SOB, cough, fever, abdominal pain or swelling, frequency or urgency. does not take any medication at home.    in the ED, EKG showed STEMI anterior, taken to cath lab s/p PCI, ROMI to mLAD. (19 May 2021 20:20)      INTERVAL HISTORY:  pt admitted yesterday for STEMI--s/p cath with PCI x2 ROMI to mid-LAD lesion    This morning, pt feels much improved. rates CP at /10 compared to 10/10 on presentation. denies any SOB, n/v/d/c, has not yet gotten OOB    PAST MEDICAL & SURGICAL HISTORY  No pertinent past medical history    No significant past surgical history        ALLERGIES:  No Known Allergies      MEDICATIONS:  MEDICATIONS  (STANDING):  aspirin  chewable 81 milliGRAM(s) Oral daily  atorvastatin 80 milliGRAM(s) Oral at bedtime  chlorhexidine 4% Liquid 1 Application(s) Topical <User Schedule>  enoxaparin Injectable 40 milliGRAM(s) SubCutaneous at bedtime  metoprolol tartrate 12.5 milliGRAM(s) Oral two times a day  nicotine -   7 mG/24Hr(s) Patch 1 patch Transdermal daily  sodium chloride 0.9%. 1000 milliLiter(s) (100 mL/Hr) IV Continuous <Continuous>  ticagrelor 90 milliGRAM(s) Oral every 12 hours    MEDICATIONS  (PRN):      HOME MEDICATIONS:  Home Medications:        OBJECTIVE:  ICU Vital Signs Last 24 Hrs  T(C): 37.1 (19 May 2021 20:12), Max: 37.1 (19 May 2021 20:12)  T(F): 98.7 (19 May 2021 20:12), Max: 98.7 (19 May 2021 20:12)  HR: 78 (20 May 2021 06:05) (78 - 102)  BP: 111/70 (20 May 2021 06:05) (104/63 - 127/87)  BP(mean): 83 (20 May 2021 06:05) (83 - 101)  ABP: --  ABP(mean): --  RR: 16 (20 May 2021 06:05) (12 - 21)  SpO2: 95% (20 May 2021 04:05) (93% - 100%)      Adult Advanced Hemodynamics Last 24 Hrs  CVP(mm Hg): --  CVP(cm H2O): --  CO: --  CI: --  PA: --  PA(mean): --  PCWP: --  SVR: --  SVRI: --  PVR: --  PVRI: --  I&O's Summary    19 May 2021 07:01  -  20 May 2021 06:53  --------------------------------------------------------  IN: 1000 mL / OUT: 900 mL / NET: 100 mL      Daily Height in cm: 177 (19 May 2021 20:12)    Daily Weight in k.1 (20 May 2021 06:05)    PHYSICAL EXAM:  NEURO: patient is awake , alert and oriented  GEN: Not in acute distress  NECK: no JVD  LUNGS: Clear to auscultation bilaterally; non labored  CARDIOVASCULAR: Regular rate and rhythm, no murmurs rubs  ABD: Soft, non-tender, non-distended,  EXT: No NICOLE  SKIN: Intact, rt wrist bandaged  ACCESS Site: swelling without hematoma    LABS:                        15.4   16.65 )-----------( 250      ( 20 May 2021 04:17 )             45.3     05-20    133<L>  |  104  |  14  ----------------------------<  93  4.3   |  18  |  1.3    Ca    8.9      20 May 2021 04:17  Phos  3.4     05-20  Mg     1.9     05-20    TPro  6.6  /  Alb  3.8  /  TBili  0.4  /  DBili  x   /  AST  30  /  ALT  15  /  AlkPhos  78  05-20    PT/INR - ( 19 May 2021 16:35 )   PT: 10.80 sec;   INR: 0.94 ratio         PTT - ( 19 May 2021 16:35 )  PTT:30.4 sec  Troponin T, Serum: 0.03 ng/mL *HH* (21 @ 16:35)    CARDIAC MARKERS ( 19 May 2021 16:35 )  x     / 0.03 ng/mL / x     / x     / x            Troponin trend:       Chol 196 LDL -- HDL 31<L> Trig 205<H>      RADIOLOGY:  -CXR:  -TTE:  -CATHETERIZATION :  LEFT HEART CATHETERIZATION                                    Left main: no disease  LAD:  100% acute occlusion in mid segment  Left Circumflex: patent  Right Coronary Artery: 100% ostial chronic total occlusion , filled by collateral distally      POST-OP DIAGNOSIS  STEMI , culprit lesion is mid LAD s/p IVUS guided PCI with 2 ROMI      ECG:  NSR     TELEMETRY EVENTS:  Run of non sustained VT x10 beats (8 sec) at 01:39

## 2021-05-21 ENCOUNTER — TRANSCRIPTION ENCOUNTER (OUTPATIENT)
Age: 56
End: 2021-05-21

## 2021-05-21 VITALS — SYSTOLIC BLOOD PRESSURE: 108 MMHG | DIASTOLIC BLOOD PRESSURE: 66 MMHG | HEART RATE: 70 BPM | TEMPERATURE: 98 F

## 2021-05-21 LAB
ALBUMIN SERPL ELPH-MCNC: 3.9 G/DL — SIGNIFICANT CHANGE UP (ref 3.5–5.2)
ALP SERPL-CCNC: 72 U/L — SIGNIFICANT CHANGE UP (ref 30–115)
ALT FLD-CCNC: 13 U/L — SIGNIFICANT CHANGE UP (ref 0–41)
ANION GAP SERPL CALC-SCNC: 10 MMOL/L — SIGNIFICANT CHANGE UP (ref 7–14)
AST SERPL-CCNC: 29 U/L — SIGNIFICANT CHANGE UP (ref 0–41)
BILIRUB SERPL-MCNC: 0.7 MG/DL — SIGNIFICANT CHANGE UP (ref 0.2–1.2)
BUN SERPL-MCNC: 14 MG/DL — SIGNIFICANT CHANGE UP (ref 10–20)
CALCIUM SERPL-MCNC: 9 MG/DL — SIGNIFICANT CHANGE UP (ref 8.5–10.1)
CHLORIDE SERPL-SCNC: 102 MMOL/L — SIGNIFICANT CHANGE UP (ref 98–110)
CO2 SERPL-SCNC: 22 MMOL/L — SIGNIFICANT CHANGE UP (ref 17–32)
CREAT SERPL-MCNC: 1.4 MG/DL — SIGNIFICANT CHANGE UP (ref 0.7–1.5)
GLUCOSE SERPL-MCNC: 91 MG/DL — SIGNIFICANT CHANGE UP (ref 70–99)
HCT VFR BLD CALC: 44 % — SIGNIFICANT CHANGE UP (ref 42–52)
HGB BLD-MCNC: 14.9 G/DL — SIGNIFICANT CHANGE UP (ref 14–18)
MAGNESIUM SERPL-MCNC: 1.9 MG/DL — SIGNIFICANT CHANGE UP (ref 1.8–2.4)
MCHC RBC-ENTMCNC: 31.2 PG — HIGH (ref 27–31)
MCHC RBC-ENTMCNC: 33.9 G/DL — SIGNIFICANT CHANGE UP (ref 32–37)
MCV RBC AUTO: 92.1 FL — SIGNIFICANT CHANGE UP (ref 80–94)
NRBC # BLD: 0 /100 WBCS — SIGNIFICANT CHANGE UP (ref 0–0)
PLATELET # BLD AUTO: 245 K/UL — SIGNIFICANT CHANGE UP (ref 130–400)
POTASSIUM SERPL-MCNC: 4.6 MMOL/L — SIGNIFICANT CHANGE UP (ref 3.5–5)
POTASSIUM SERPL-SCNC: 4.6 MMOL/L — SIGNIFICANT CHANGE UP (ref 3.5–5)
PROT SERPL-MCNC: 6.7 G/DL — SIGNIFICANT CHANGE UP (ref 6–8)
RBC # BLD: 4.78 M/UL — SIGNIFICANT CHANGE UP (ref 4.7–6.1)
RBC # FLD: 13.1 % — SIGNIFICANT CHANGE UP (ref 11.5–14.5)
SODIUM SERPL-SCNC: 134 MMOL/L — LOW (ref 135–146)
WBC # BLD: 9.95 K/UL — SIGNIFICANT CHANGE UP (ref 4.8–10.8)
WBC # FLD AUTO: 9.95 K/UL — SIGNIFICANT CHANGE UP (ref 4.8–10.8)

## 2021-05-21 PROCEDURE — 99233 SBSQ HOSP IP/OBS HIGH 50: CPT

## 2021-05-21 PROCEDURE — 93010 ELECTROCARDIOGRAM REPORT: CPT

## 2021-05-21 RX ORDER — METOPROLOL TARTRATE 50 MG
12.5 TABLET ORAL ONCE
Refills: 0 | Status: COMPLETED | OUTPATIENT
Start: 2021-05-21 | End: 2021-05-21

## 2021-05-21 RX ORDER — ASPIRIN/CALCIUM CARB/MAGNESIUM 324 MG
1 TABLET ORAL
Qty: 0 | Refills: 0 | DISCHARGE
Start: 2021-05-21

## 2021-05-21 RX ORDER — METOPROLOL TARTRATE 50 MG
37.5 TABLET ORAL
Refills: 0 | Status: DISCONTINUED | OUTPATIENT
Start: 2021-05-21 | End: 2021-05-21

## 2021-05-21 RX ORDER — METOPROLOL TARTRATE 50 MG
1 TABLET ORAL
Qty: 60 | Refills: 0
Start: 2021-05-21 | End: 2021-06-19

## 2021-05-21 RX ORDER — ATORVASTATIN CALCIUM 80 MG/1
1 TABLET, FILM COATED ORAL
Qty: 30 | Refills: 0
Start: 2021-05-21 | End: 2021-06-19

## 2021-05-21 RX ORDER — MAGNESIUM SULFATE 500 MG/ML
2 VIAL (ML) INJECTION ONCE
Refills: 0 | Status: COMPLETED | OUTPATIENT
Start: 2021-05-21 | End: 2021-05-21

## 2021-05-21 RX ADMIN — Medication 1 PATCH: at 11:30

## 2021-05-21 RX ADMIN — Medication 81 MILLIGRAM(S): at 11:30

## 2021-05-21 RX ADMIN — CHLORHEXIDINE GLUCONATE 1 APPLICATION(S): 213 SOLUTION TOPICAL at 05:03

## 2021-05-21 RX ADMIN — Medication 1 PATCH: at 11:32

## 2021-05-21 RX ADMIN — TICAGRELOR 90 MILLIGRAM(S): 90 TABLET ORAL at 05:02

## 2021-05-21 RX ADMIN — Medication 50 GRAM(S): at 08:23

## 2021-05-21 RX ADMIN — Medication 12.5 MILLIGRAM(S): at 09:43

## 2021-05-21 RX ADMIN — Medication 25 MILLIGRAM(S): at 05:03

## 2021-05-21 RX ADMIN — Medication 1 PATCH: at 05:03

## 2021-05-21 NOTE — PROGRESS NOTE ADULT - ASSESSMENT
IMPRESSION:    Anterior STEMI s/p PCI to mLAD  DL  Smoking  Mild to moderate MS  Moderate to severe MR  Rheumatic MV      PLAN:    CNS: avoid sedatives     HEENT: Oral care    PULMONARY:  HOB @ 45 degrees    CARDIOVASCULAR:  - c/w DAPT (ASA/brilinta)  - c/w lipitor and increase metoprolol to 37.5 mg q12h  - Echo  - Nicotine patch  - Will need close outpatient follow-up with cardiology for MS/MR    GI: GI prophylaxis.  Feeding DASH diet     RENAL:  Follow up lytes.  Correct as needed    INFECTIOUS DISEASE: Follow up cultures    HEMATOLOGICAL:  DVT prophylaxis.    ENDOCRINE:  Follow up FS.  Insulin protocol if needed.    MUSCULOSKELETAL: OOB to chair     DISPO: d/c home today, outpatient cardiology f/u
IMPRESSION:    Anterior STEMI s/p PCI to mLAD  DL  Smoking        PLAN:    CNS: avoid sedatives     HEENT: Oral care    PULMONARY:  HOB @ 45 degrees    CARDIOVASCULAR:  - c/w DAPT (ASA/brilinta)  - c/w lipitor and increase metoprolol to 25 mg q12h  - Echo  - Nicotine patch    GI: GI prophylaxis.  Feeding DASH diet     RENAL:  Follow up lytes.  Correct as needed    INFECTIOUS DISEASE: Follow up cultures    HEMATOLOGICAL:  DVT prophylaxis.    ENDOCRINE:  Follow up FS.  Insulin protocol if needed.    MUSCULOSKELETAL: OOB to chair

## 2021-05-21 NOTE — DISCHARGE NOTE PROVIDER - HOSPITAL COURSE
Chest pain 1 hr duration    54 yo male with no significant pmh but significant family history of CAD in mother and brother.   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 He called EMS, was done EKG found to have stemi and brought to the ED.  Aspirin was given on route.  patient also reports that he had brief episode of chest discomfort yesterday morning but got relieved on its own.  no Headache, focal deficit, SOB, cough, fever, abdominal pain or swelling, frequency or urgency. does not take any medication at home.    in the ED, EKG showed STEMI anterior, taken to cath lab s/p PCI, ROMI x2 to mLAD.    Pt transferred to CCU for further monitoring and management. Pt had 1 run of non sustained VT and metoprolol was increased.  ECHO notable for mod-severe MR and mild MS suggestive of Rheumatic heart disease  Pt ambulated without issue  Pt discharged without any CP

## 2021-05-21 NOTE — DISCHARGE NOTE PROVIDER - NSFOLLOWUPCLINICS_GEN_ALL_ED_FT
[facility]  Cardiology  [address], [address]  [place] [address]  Phone: [phone]  Fax: [phone]    [facility]  Cardiology  [address] [place] [id number]  Phone: [phone]  Fax:

## 2021-05-21 NOTE — PROGRESS NOTE ADULT - SUBJECTIVE AND OBJECTIVE BOX
Cardiology Follow up    DRISS MOORE   55y Male  PAST MEDICAL & SURGICAL HISTORY:  No pertinent past medical history    No significant past surgical history         HPI:  Chest pain 1 hr duration    56 yo male with no significant pmh but significant family history of CAD in mother and brother.   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 He called EMS, was done EKG found to have stemi and brought to the ED.  Aspirin was given on route.  patient also reports that he had brief episode of chest discomfort yesterday morning but got relieved on its own.  no Headache, focal deficit, SOB, cough, fever, abdominal pain or swelling, frequency or urgency. does not take any medication at home.    in the ED, EKG showed STEMI anterior, taken to cath lab s/p PCI, ROMI to mLAD. (19 May 2021 20:20)    Allergies    No Known Allergies    Intolerances    Patient seen and examined at bedside. No acute events overnight.  Patient without complaints. Pt ambulated without issues/symptoms  Denies CP, SOB, palpitations, or dizziness  No events on telemetry overnight    Vital Signs Last 24 Hrs  T(C): 36.7 (21 May 2021 08:00), Max: 36.8 (21 May 2021 04:00)  T(F): 98 (21 May 2021 08:00), Max: 98.3 (21 May 2021 04:00)  HR: 80 (21 May 2021 10:00) (70 - 90)  BP: 106/70 (21 May 2021 10:00) (106/70 - 138/77)  BP(mean): 81 (21 May 2021 10:00) (80 - 106)  RR: 16 (21 May 2021 10:00) (15 - 18)  SpO2: 99% (21 May 2021 08:00) (97% - 99%)    MEDICATIONS  (STANDING):  aspirin  chewable 81 milliGRAM(s) Oral daily  atorvastatin 80 milliGRAM(s) Oral at bedtime  chlorhexidine 4% Liquid 1 Application(s) Topical <User Schedule>  enoxaparin Injectable 40 milliGRAM(s) SubCutaneous at bedtime  metoprolol tartrate 37.5 milliGRAM(s) Oral two times a day  nicotine -   7 mG/24Hr(s) Patch 1 patch Transdermal daily  ticagrelor 90 milliGRAM(s) Oral every 12 hours    MEDICATIONS  (PRN):    REVIEW OF SYSTEMS:          All negative except as mentioned in HPI    PHYSICAL EXAM:           CONSTITUTIONAL: Well-developed; well-nourished; in no acute distress  	SKIN: warm, dry  	HEAD: Normocephalic; atraumatic  	EYES: PERRL.  	ENT: No nasal discharge, airway clear, mucous membranes moist  	NECK: Supple; non tender.  	CARD: +S1, +S2, no murmurs, gallops, or rubs. Regular rate and rhythm    	RESP: No wheezes, rales or rhonchi. CTA B/L  	ABD: soft ntnd, + BS x 4 quadrants  	EXT: moves all extremities,  no clubbing, cyanosis or edema  	NEURO: Alert and oriented x3, no focal deficits          PSYCH: Cooperative, appropriate          VASCULAR:  + 2 Rad / + 2 PTs / + 2 DPs          EXTREMITY:              Right Radial: pressure dressing removed, access site soft, no hematoma, no pain, + pulses, no sign of infection, no numbness    ECG:   < from: 12 Lead ECG (05.21.21 @ 05:31) >    Ventricular Rate 75 BPM    Atrial Rate 75 BPM    P-R Interval 156 ms    QRS Duration 74 ms    Q-T Interval 384 ms    QTC Calculation(Bazett) 428 ms    P Axis 5 degrees    R Axis 49 degrees    T Axis -31 degrees    Diagnosis Line Normal sinus rhythm  T wave abnormality, consider inferior ischemia  T wave abnormality, consider anterior ischemia  Abnormal ECG    Confirmed by NICK VIDALES MD ([id number]) on 5/21/2021 7:34:02 AM                                                                                            2D ECHO:  < from: TTE Echo Complete w/o Contrast w/ Doppler (05.20.21 @ 16:46) >  Summary:   1. LV Ejection Fraction by Rae's Method with a biplane EF of 63 %.   2. Spectral Doppler shows pseudonormal pattern of left ventricular myocardial filling (Grade II diastolic dysfunction).   3. Mild to moderately enlarged left atrium.   4. Mild mitral stenosis.   5. Moderate to severe mitral valve regurgitation.   6. Thickening of theanterior and posterior mitral valve leaflets.   7. Mild tricuspid regurgitation.    LABS:                        14.9   9.95  )-----------( 245      ( 21 May 2021 04:50 )             44.0     05-21    134<L>  |  102  |  14  ----------------------------<  91  4.6   |  22  |  1.4    Ca    9.0      21 May 2021 04:50  Phos  3.4     05-20  Mg     1.9     05-21    TPro  6.7  /  Alb  3.9  /  TBili  0.7  /  DBili  x   /  AST  29  /  ALT  13  /  AlkPhos  72  05-21    CARDIAC MARKERS ( 19 May 2021 16:35 )  x     / 0.03 ng/mL / x     / x     / x        Magnesium, Serum: 1.9 mg/dL [1.8 - 2.4] (05-21-21 @ 04:50)  LIVER FUNCTIONS - ( 21 May 2021 04:50 )  Alb: 3.9 g/dL / Pro: 6.7 g/dL / ALK PHOS: 72 U/L / ALT: 13 U/L / AST: 29 U/L / GGT: x                    LDL Cholesterol Calculated: 147 mg/dL   A1C with Estimated Average Glucose Result: 5.3    A/P:  I discussed the case with Cardiologist Dr. Bush and recommend the following:    S/P PCI:   INTERVENTION  IMPLANTS: 2 ROMI    APPROPRIATE USE CRITERIA (AUC): 9    POST-OP DIAGNOSIS  STEMI , culprit lesion is mid LAD s/p IVUS guided PCI with 2 ROMI                      No ACEi/ARB due to elevated Cr, will reevaluate tomorrow                    Titrate BB to HR 60-70 bmp  	     Continue DAPT ( Aspirin 81 mg PO Daily and Brilinta 90 mg PO q12hr ), B-Blocker, Statin Therapy                   Patient pharmacy called in for Brilinta prescription and it is 30$ per month, patient is agreeing for it and medication is available for  5/21/21                    GI / DVT prophylaxis                    Keep K = 4, Mg = 2                   Ambulate as tolerated, OOB to chair                    Patient agreeing to take DAPT for at least one year or as directed by cardiologist                    Pt given instructions on importance of taking antiplatelet medication or risk acute stent thrombosis/death                   Post cath instructions, access site care and activity restrictions reviewed with patient                     Discussed with patient to return to hospital if experience chest pain, shortness breath, dizziness and site bleeding                   Aggressive risk factor modification, diet counseling, smoking cessation discussed with patient                       Patient need close outpatient follow-up with cardiology for MS/MR                   Cardiac rehab information provided/ referral and communication to cardiac rehab provided                                                       Cardiology Follow up    DRISS MOORE   55y Male  PAST MEDICAL & SURGICAL HISTORY:  No pertinent past medical history    No significant past surgical history         HPI:  Chest pain 1 hr duration    54 yo male with no significant pmh but significant family history of CAD in mother and brother.   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 He called EMS, was done EKG found to have stemi and brought to the ED.  Aspirin was given on route.  patient also reports that he had brief episode of chest discomfort yesterday morning but got relieved on its own.  no Headache, focal deficit, SOB, cough, fever, abdominal pain or swelling, frequency or urgency. does not take any medication at home.    in the ED, EKG showed STEMI anterior, taken to cath lab s/p PCI, ROMI to mLAD. (19 May 2021 20:20)    Allergies    No Known Allergies    Intolerances    Patient seen and examined at bedside. No acute events overnight.  Patient without complaints. Pt ambulated without issues/symptoms  Denies CP, SOB, palpitations, or dizziness  No events on telemetry overnight    Vital Signs Last 24 Hrs  T(C): 36.7 (21 May 2021 08:00), Max: 36.8 (21 May 2021 04:00)  T(F): 98 (21 May 2021 08:00), Max: 98.3 (21 May 2021 04:00)  HR: 80 (21 May 2021 10:00) (70 - 90)  BP: 106/70 (21 May 2021 10:00) (106/70 - 138/77)  BP(mean): 81 (21 May 2021 10:00) (80 - 106)  RR: 16 (21 May 2021 10:00) (15 - 18)  SpO2: 99% (21 May 2021 08:00) (97% - 99%)    MEDICATIONS  (STANDING):  aspirin  chewable 81 milliGRAM(s) Oral daily  atorvastatin 80 milliGRAM(s) Oral at bedtime  chlorhexidine 4% Liquid 1 Application(s) Topical <User Schedule>  enoxaparin Injectable 40 milliGRAM(s) SubCutaneous at bedtime  metoprolol tartrate 37.5 milliGRAM(s) Oral two times a day  nicotine -   7 mG/24Hr(s) Patch 1 patch Transdermal daily  ticagrelor 90 milliGRAM(s) Oral every 12 hours    MEDICATIONS  (PRN):    REVIEW OF SYSTEMS:          All negative except as mentioned in HPI    PHYSICAL EXAM:           CONSTITUTIONAL: Well-developed; well-nourished; in no acute distress  	SKIN: warm, dry  	HEAD: Normocephalic; atraumatic  	EYES: PERRL.  	ENT: No nasal discharge, airway clear, mucous membranes moist  	NECK: Supple; non tender.  	CARD: +S1, +S2, no murmurs, gallops, or rubs. Regular rate and rhythm    	RESP: No wheezes, rales or rhonchi. CTA B/L  	ABD: soft ntnd, + BS x 4 quadrants  	EXT: moves all extremities,  no clubbing, cyanosis or edema  	NEURO: Alert and oriented x3, no focal deficits          PSYCH: Cooperative, appropriate          VASCULAR:  + 2 Rad / + 2 PTs / + 2 DPs          EXTREMITY:              Right Radial: access site soft, no hematoma, no pain, + pulses, no sign of infection, no numbness    ECG:   < from: 12 Lead ECG (05.21.21 @ 05:31) >    Ventricular Rate 75 BPM    Atrial Rate 75 BPM    P-R Interval 156 ms    QRS Duration 74 ms    Q-T Interval 384 ms    QTC Calculation(Bazett) 428 ms    P Axis 5 degrees    R Axis 49 degrees    T Axis -31 degrees    Diagnosis Line Normal sinus rhythm  T wave abnormality, consider inferior ischemia  T wave abnormality, consider anterior ischemia  Abnormal ECG    Confirmed by NICK VIDALES MD ([id number]) on 5/21/2021 7:34:02 AM                                                                                            2D ECHO:  < from: TTE Echo Complete w/o Contrast w/ Doppler (05.20.21 @ 16:46) >  Summary:   1. LV Ejection Fraction by Rae's Method with a biplane EF of 63 %.   2. Spectral Doppler shows pseudonormal pattern of left ventricular myocardial filling (Grade II diastolic dysfunction).   3. Mild to moderately enlarged left atrium.   4. Mild mitral stenosis.   5. Moderate to severe mitral valve regurgitation.   6. Thickening of theanterior and posterior mitral valve leaflets.   7. Mild tricuspid regurgitation.    LABS:                        14.9   9.95  )-----------( 245      ( 21 May 2021 04:50 )             44.0     05-21    134<L>  |  102  |  14  ----------------------------<  91  4.6   |  22  |  1.4    Ca    9.0      21 May 2021 04:50  Phos  3.4     05-20  Mg     1.9     05-21    TPro  6.7  /  Alb  3.9  /  TBili  0.7  /  DBili  x   /  AST  29  /  ALT  13  /  AlkPhos  72  05-21    CARDIAC MARKERS ( 19 May 2021 16:35 )  x     / 0.03 ng/mL / x     / x     / x        Magnesium, Serum: 1.9 mg/dL [1.8 - 2.4] (05-21-21 @ 04:50)  LIVER FUNCTIONS - ( 21 May 2021 04:50 )  Alb: 3.9 g/dL / Pro: 6.7 g/dL / ALK PHOS: 72 U/L / ALT: 13 U/L / AST: 29 U/L / GGT: x                    LDL Cholesterol Calculated: 147 mg/dL   A1C with Estimated Average Glucose Result: 5.3    A/P:  I discussed the case with Cardiologist Dr. Bush and recommend the following:    S/P PCI:   INTERVENTION  IMPLANTS: 2 ROMI    APPROPRIATE USE CRITERIA (AUC): 9    POST-OP DIAGNOSIS  STEMI , culprit lesion is mid LAD s/p IVUS guided PCI with 2 ROMI                      No ACEi/ARB due to elevated Cr, will reevaluate as OP                     Titrate BB to HR 60-70 bmp  	     Continue DAPT ( Aspirin 81 mg PO Daily and Brilinta 90 mg PO q12hr ), B-Blocker, Statin Therapy                   Patient pharmacy called in for Brilinta prescription and it is 30$ per month, patient is agreeing for it and medication is available for  5/21/21                    GI / DVT prophylaxis                    Keep K = 4, Mg = 2                   Ambulate as tolerated, OOB to chair                    Patient agreeing to take DAPT for at least one year or as directed by cardiologist                    Pt given instructions on importance of taking antiplatelet medication or risk acute stent thrombosis/death                   Post cath instructions, access site care and activity restrictions reviewed with patient                     Discussed with patient to return to hospital if experience chest pain, shortness breath, dizziness and site bleeding                   Aggressive risk factor modification, diet counseling, smoking cessation discussed with patient                       Patient need close outpatient follow-up with cardiology for MS/MR                   Cardiac rehab information provided/ referral and communication to cardiac rehab provided                   Follow up with Cardiology Dr. Bush in two weeks. Instructed to call and make an appointment

## 2021-05-21 NOTE — DISCHARGE NOTE PROVIDER - NSDCMRMEDTOKEN_GEN_ALL_CORE_FT
ticagrelor 90 mg oral tablet: 1 tab(s) orally 2 times a day MDD:2   aspirin 81 mg oral tablet, chewable: 1 tab(s) orally once a day  atorvastatin 80 mg oral tablet: 1 tab(s) orally once a day (at bedtime)  metoprolol tartrate 37.5 mg oral tablet: 1 tab(s) orally 2 times a day  ticagrelor 90 mg oral tablet: 1 tab(s) orally 2 times a day MDD:2

## 2021-05-21 NOTE — DISCHARGE NOTE PROVIDER - PROVIDER TOKENS
PROVIDER:[TOKEN:[[id number]],FOLLOWUP:[1 week]],PROVIDER:[TOKEN:[[id number]],FOLLOWUP:[1 week],ESTABLISHEDPATIENT:[T]]

## 2021-05-21 NOTE — DISCHARGE NOTE NURSING/CASE MANAGEMENT/SOCIAL WORK - PATIENT PORTAL LINK FT
You can access the FollowMyHealth Patient Portal offered by [facility] by registering at the following website: [email or web address]. By joining [facility]’s FollowMyHealth portal, you will also be able to view your health information using other applications (apps) compatible with our system.

## 2021-05-21 NOTE — PROGRESS NOTE ADULT - SUBJECTIVE AND OBJECTIVE BOX
HPI:  Chest pain 1 hr duration    54 yo male with no significant pmh but significant family history of CAD in mother and brother.   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 He called EMS, was done EKG found to have stemi and brought to the ED.  Aspirin was given on route.  patient also reports that he had brief episode of chest discomfort yesterday morning but got relieved on its own.  no Headache, focal deficit, SOB, cough, fever, abdominal pain or swelling, frequency or urgency. does not take any medication at home.    in the ED, EKG showed STEMI anterior, taken to cath lab s/p PCI, ROMI to mLAD. (19 May 2021 20:20)      INTERVAL HISTORY:  s/p cath  with ROMI x2 to mid LAD lesion  ECHO performed  showing EF WNL notable for mod-severe MR  this morning, pt denies any cp, n/v/d/c, sob.     PAST MEDICAL & SURGICAL HISTORY  No pertinent past medical history    No significant past surgical history        ALLERGIES:  No Known Allergies      MEDICATIONS:  MEDICATIONS  (STANDING):  aspirin  chewable 81 milliGRAM(s) Oral daily  atorvastatin 80 milliGRAM(s) Oral at bedtime  chlorhexidine 4% Liquid 1 Application(s) Topical <User Schedule>  enoxaparin Injectable 40 milliGRAM(s) SubCutaneous at bedtime  metoprolol tartrate 25 milliGRAM(s) Oral two times a day  nicotine -   7 mG/24Hr(s) Patch 1 patch Transdermal daily  ticagrelor 90 milliGRAM(s) Oral every 12 hours    MEDICATIONS  (PRN):      HOME MEDICATIONS:  Home Medications:        OBJECTIVE:  ICU Vital Signs Last 24 Hrs  T(C): 36.8 (21 May 2021 04:00), Max: 36.8 (21 May 2021 04:00)  T(F): 98.3 (21 May 2021 04:00), Max: 98.3 (21 May 2021 04:00)  HR: 88 (21 May 2021 06:00) (70 - 90)  BP: 113/70 (21 May 2021 06:00) (107/63 - 138/77)  BP(mean): 84 (21 May 2021 06:00) (80 - 106)  ABP: --  ABP(mean): --  RR: 16 (21 May 2021 06:00) (15 - 18)  SpO2: 97% (21 May 2021 04:00) (96% - 98%)      Adult Advanced Hemodynamics Last 24 Hrs  CVP(mm Hg): --  CVP(cm H2O): --  CO: --  CI: --  PA: --  PA(mean): --  PCWP: --  SVR: --  SVRI: --  PVR: --  PVRI: --  I&O's Summary    20 May 2021 07:01  -  21 May 2021 07:00  --------------------------------------------------------  IN: 940 mL / OUT: 400 mL / NET: 540 mL      Daily     Daily Weight in k.9 (21 May 2021 06:00)    PHYSICAL EXAM:  NEURO: patient is awake , alert and oriented  GEN: Not in acute distress  NECK: no JVD  LUNGS: Clear to auscultation bilaterally; non labored  CARDIOVASCULAR: Regular rate and rhythm, no murmurs rubs  ABD: Soft, non-tender, non-distended,  EXT: No NICOLE  SKIN: Intact  ACCESS Site: swelling without hematoma      LABS:                        14.9   9.95  )-----------( 245      ( 21 May 2021 04:50 )             44.0     05-21    134<L>  |  102  |  14  ----------------------------<  91  4.6   |  22  |  1.4    Ca    9.0      21 May 2021 04:50  Phos  3.4     05-20  Mg     1.9     05-21    TPro  6.7  /  Alb  3.9  /  TBili  0.7  /  DBili  x   /  AST  29  /  ALT  13  /  AlkPhos  72  05-21    PT/INR - ( 19 May 2021 16:35 )   PT: 10.80 sec;   INR: 0.94 ratio         PTT - ( 19 May 2021 16:35 )  PTT:30.4 sec    CARDIAC MARKERS ( 19 May 2021 16:35 )  x     / 0.03 ng/mL / x     / x     / x            Troponin trend:       Chol 196 LDL -- HDL 31<L> Trig 205<H>      RADIOLOGY:  -CXR:  -TTE :  Summary:   1. LV Ejection Fraction by Rae's Method with a biplane EF of 63 %.   2. Spectral Doppler shows pseudonormal pattern of left ventricular myocardial filling (Grade II diastolic dysfunction).   3. Mild to moderately enlarged left atrium.   4. Mild mitral stenosis.   5. Moderate to severe mitral valve regurgitation.   6. Thickening of theanterior and posterior mitral valve leaflets.   7. Mild tricuspid regurgitation.    -STRESS TEST:  -CATHETERIZATION:   LEFT HEART CATHETERIZATION                                Left main: no disease  LAD:  100% acute occlusion in mid segment  Left Circumflex: patent  Right Coronary Artery: 100% ostial chronic total occlusion , filled by collateral distally      POST-OP DIAGNOSIS  STEMI , culprit lesion is mid LAD s/p IVUS guided PCI with 2 ROMI      ECG:    TELEMETRY EVENTS:  No events     HPI:  Chest pain 1 hr duration    56 yo male with no significant pmh but significant family history of CAD in mother and brother.   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 He called EMS, was done EKG found to have stemi and brought to the ED.  Aspirin was given on route.  patient also reports that he had brief episode of chest discomfort yesterday morning but got relieved on its own.  no Headache, focal deficit, SOB, cough, fever, abdominal pain or swelling, frequency or urgency. does not take any medication at home.    in the ED, EKG showed STEMI anterior, taken to cath lab s/p PCI, ROMI to mLAD. (19 May 2021 20:20)      INTERVAL HISTORY:  s/p cath  with ROMI x2 to mid LAD lesion  ECHO performed  showing EF WNL notable for mod-severe MR  this morning, pt denies any cp, n/v/d/c, sob.     PAST MEDICAL & SURGICAL HISTORY  No pertinent past medical history    No significant past surgical history        ALLERGIES:  No Known Allergies      MEDICATIONS:  MEDICATIONS  (STANDING):  aspirin  chewable 81 milliGRAM(s) Oral daily  atorvastatin 80 milliGRAM(s) Oral at bedtime  chlorhexidine 4% Liquid 1 Application(s) Topical <User Schedule>  enoxaparin Injectable 40 milliGRAM(s) SubCutaneous at bedtime  metoprolol tartrate 25 milliGRAM(s) Oral two times a day  nicotine -   7 mG/24Hr(s) Patch 1 patch Transdermal daily  ticagrelor 90 milliGRAM(s) Oral every 12 hours    MEDICATIONS  (PRN):      HOME MEDICATIONS:  Home Medications:        OBJECTIVE:  ICU Vital Signs Last 24 Hrs  T(C): 36.8 (21 May 2021 04:00), Max: 36.8 (21 May 2021 04:00)  T(F): 98.3 (21 May 2021 04:00), Max: 98.3 (21 May 2021 04:00)  HR: 88 (21 May 2021 06:00) (70 - 90)  BP: 113/70 (21 May 2021 06:00) (107/63 - 138/77)  BP(mean): 84 (21 May 2021 06:00) (80 - 106)  ABP: --  ABP(mean): --  RR: 16 (21 May 2021 06:00) (15 - 18)  SpO2: 97% (21 May 2021 04:00) (96% - 98%)      Adult Advanced Hemodynamics Last 24 Hrs  CVP(mm Hg): --  CVP(cm H2O): --  CO: --  CI: --  PA: --  PA(mean): --  PCWP: --  SVR: --  SVRI: --  PVR: --  PVRI: --  I&O's Summary    20 May 2021 07:01  -  21 May 2021 07:00  --------------------------------------------------------  IN: 940 mL / OUT: 400 mL / NET: 540 mL      Daily     Daily Weight in k.9 (21 May 2021 06:00)    PHYSICAL EXAM:  NEURO: patient is awake , alert and oriented  GEN: Not in acute distress  NECK: no JVD  LUNGS: Clear to auscultation bilaterally; non labored  CARDIOVASCULAR: Regular rate and rhythm, no murmurs rubs  ABD: Soft, non-tender, non-distended,  EXT: No NICOLE  SKIN: Intact  ACCESS Site: swelling without hematoma      LABS:                        14.9   9.95  )-----------( 245      ( 21 May 2021 04:50 )             44.0     05-21    134<L>  |  102  |  14  ----------------------------<  91  4.6   |  22  |  1.4    Ca    9.0      21 May 2021 04:50  Phos  3.4     05-20  Mg     1.9     05-21    TPro  6.7  /  Alb  3.9  /  TBili  0.7  /  DBili  x   /  AST  29  /  ALT  13  /  AlkPhos  72  05-21    PT/INR - ( 19 May 2021 16:35 )   PT: 10.80 sec;   INR: 0.94 ratio         PTT - ( 19 May 2021 16:35 )  PTT:30.4 sec    CARDIAC MARKERS ( 19 May 2021 16:35 )  x     / 0.03 ng/mL / x     / x     / x            Troponin trend:       Chol 196 LDL -- HDL 31<L> Trig 205<H>      RADIOLOGY:  -CXR:  -TTE :  Summary:   1. LV Ejection Fraction by Rae's Method with a biplane EF of 63 %.   2. Spectral Doppler shows pseudonormal pattern of left ventricular myocardial filling (Grade II diastolic dysfunction).   3. Mild to moderately enlarged left atrium.   4. Mild mitral stenosis.   5. Moderate to severe mitral valve regurgitation.   6. Thickening of theanterior and posterior mitral valve leaflets.   7. Mild tricuspid regurgitation.    -STRESS TEST:  -CATHETERIZATION:   LEFT HEART CATHETERIZATION                                Left main: no disease  LAD:  100% acute occlusion in mid segment  Left Circumflex: patent  Right Coronary Artery: 100% ostial chronic total occlusion , filled by collateral distally      POST-OP DIAGNOSIS  STEMI , culprit lesion is mid LAD s/p IVUS guided PCI with 2 ROMI      ECG:    TELEMETRY EVENTS:  No events

## 2021-05-21 NOTE — DISCHARGE NOTE NURSING/CASE MANAGEMENT/SOCIAL WORK - NSDCPEEMAIL_GEN_ALL_CORE
[facility] email [email or web address]

## 2021-05-21 NOTE — DISCHARGE NOTE PROVIDER - NSDCCPCAREPLAN_GEN_ALL_CORE_FT
PRINCIPAL DISCHARGE DIAGNOSIS  Diagnosis: STEMI (ST elevation myocardial infarction)  Assessment and Plan of Treatment: A myocardial infarction is the medical term for a "heart attack". This is caused by a blockage in the coronary arteries of the heart. These coronary arteries are important because they supply blood and oxygen to the heart muscle itself so that it has enough energy to pump blood to the rest of the body. When the heart does not receive enough oxygen, people often feel chest pain.  Make sure to follow up with a cardiologist, we have given you the name of a cardiologist in  if you choose to follow up with someone there. In the meantime, we have given you the information for Dr. Bush.  If you feel chest pain like the one that brought you to the hospital, make sure to call 911.

## 2021-05-21 NOTE — DISCHARGE NOTE NURSING/CASE MANAGEMENT/SOCIAL WORK - NSDCPEWEB_GEN_ALL_CORE
[facility] for Tobacco [facility] website --- [email or web address] website --- [email or web address]

## 2021-05-21 NOTE — DISCHARGE NOTE PROVIDER - CARE PROVIDERS DIRECT ADDRESSES
[email or web address]

## 2021-05-21 NOTE — PROGRESS NOTE ADULT - ATTENDING COMMENTS
Patient seen, case d/w CCU team on rounds. 2D ECHO reviewed personally.  55-yo male, s/p AW STEMI, PCI of mid LAD. Stable and asymptomatic. Normal LVEF.  Unexpected finding of rheumatic heart disease, mild MS, mod-severe MR.  Will increase Metoprolol to 37.5 mg bid.  D/c home today.  Outpatient cardiology f/u.
Patient seen, case d/w CCU team on rounds. ECGs, LHC reviewed personally.  S/p AW STEMI, ROMI of mid LAD.  1 run of AIVR overnight, vitals stable.  Right wrist: no hematoma, good radial pulse.  BB increased this morning.  OOB to chair.  2D ECHO today.  Continue CCU monitoring for 24h.

## 2021-05-21 NOTE — DISCHARGE NOTE PROVIDER - CARE PROVIDER_API CALL
Dre Ramirez (DO)  Cardiovascular Disease; Internal Medicine; Nuclear Cardiology  [address] [place] [address]  Phone: [phone]  Fax: [phone]  Follow Up Time: 1 week    Ernesto Bush  CARDIOVASCULAR DISEASE  [address]  [place] [address]  Phone: [phone]  Fax: [phone]  Established Patient  Follow Up Time: 1 week

## 2021-05-21 NOTE — DISCHARGE NOTE PROVIDER - NSDCFUADDINST_GEN_ALL_CORE_FT
Avoid strenuous activity for at least 1 week/until you follow up with your cardiologist. Avoid strenuous activity for at least 1 week/until you follow up with your cardiologist.    Continue to avoid cigarettes as smoking is one of the strongest risk factors for coronary artery disease.

## 2021-05-27 DIAGNOSIS — I21.02 ST ELEVATION (STEMI) MYOCARDIAL INFARCTION INVOLVING LEFT ANTERIOR DESCENDING CORONARY ARTERY: ICD-10-CM

## 2021-05-27 DIAGNOSIS — F17.210 NICOTINE DEPENDENCE, CIGARETTES, UNCOMPLICATED: ICD-10-CM

## 2021-05-27 DIAGNOSIS — I47.1 SUPRAVENTRICULAR TACHYCARDIA: ICD-10-CM

## 2021-05-27 DIAGNOSIS — I25.10 ATHEROSCLEROTIC HEART DISEASE OF NATIVE CORONARY ARTERY WITHOUT ANGINA PECTORIS: ICD-10-CM

## 2021-05-27 DIAGNOSIS — Z82.49 FAMILY HISTORY OF ISCHEMIC HEART DISEASE AND OTHER DISEASES OF THE CIRCULATORY SYSTEM: ICD-10-CM

## 2021-05-27 DIAGNOSIS — Z79.02 LONG TERM (CURRENT) USE OF ANTITHROMBOTICS/ANTIPLATELETS: ICD-10-CM

## 2021-05-27 DIAGNOSIS — I05.2 RHEUMATIC MITRAL STENOSIS WITH INSUFFICIENCY: ICD-10-CM

## 2021-08-06 PROBLEM — Z00.00 ENCOUNTER FOR PREVENTIVE HEALTH EXAMINATION: Status: ACTIVE | Noted: 2021-08-06

## 2024-06-20 PROBLEM — I25.10 ATHEROSCLEROTIC HEART DISEASE OF NATIVE CORONARY ARTERY WITHOUT ANGINA PECTORIS: Chronic | Status: ACTIVE | Noted: 2021-05-23

## 2024-07-15 ENCOUNTER — NON-APPOINTMENT (OUTPATIENT)
Age: 59
End: 2024-07-15

## 2024-07-15 ENCOUNTER — APPOINTMENT (OUTPATIENT)
Dept: OPHTHALMOLOGY | Facility: CLINIC | Age: 59
End: 2024-07-15
Payer: COMMERCIAL

## 2024-07-15 PROCEDURE — 92004 COMPRE OPH EXAM NEW PT 1/>: CPT
